# Patient Record
Sex: MALE | Race: WHITE
[De-identification: names, ages, dates, MRNs, and addresses within clinical notes are randomized per-mention and may not be internally consistent; named-entity substitution may affect disease eponyms.]

---

## 2019-09-30 NOTE — PRE OP HISTORY & PHYSICAL
DATE OF SURGERY:  October 1, 2019.

 

CHIEF COMPLAINT:  Lesion in the left cheek and lesion in the left floor of the mouth.

 

HISTORY OF PRESENT ILLNESS:  This 39-year-old male was noted to have a lesion in the

left cheek around the area of the puncta of the left parotid gland, which is more

swollen.  The patient denies any dysphagia, odynophagia, or shortness of breath.  He has

no trouble swallowing.  The patient did have radiation to the nose for tumor, that was

treated about 3 years ago.  The record for the treatment has not arrived.  The patient

on examination in the office was also noted to have a lesion in the left floor of the

mouth.  He smokes about 10 cigars a day and a social drinker. 

 

REVIEW OF SYSTEMS:

System review showed no recent cardiovascular, respiratory, or GI problem.

 

PAST MEDICAL HISTORY:  The patient has no significant medical problem in the past.  He

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 

 

PAST SURGICAL HISTORY:  The patient has previous bilateral shoulder surgery, right knee

surgery, and removal of testicular cancer. 

 

ALLERGIES:  THE PATIENT IS ALLERGIC TO ASPIRIN, WHICH GIVES HIM SWELLING.

 

MEDICATIONS:  He is on testosterone.

 

SOCIAL HISTORY:  He smokes about 10 cigars a day and is a social drinker.

 

FAMILY HISTORY:  Noncontributory.

 

PHYSICAL EXAMINATION:

VITAL SIGNS:  On examination, the patient's vital signs were within normal limits. 

HEENT:  Ear exam show normal tympanic membranes bilaterally.  Nasal exam showed no

obvious abnormality.  Oropharynx and oral cavity show little leukoplakia in the left

floor of the mouth and left puncta of the parotid gland was more swollen.  No

abnormality was palpable. 

NECK:  Showed no lymph node or thyroid palpable. 

CHEST:  Showed good air entry bilaterally. 

CARDIOVASCULAR:  Showed S1, S2.  No murmur noted.

ASSESSMENT AND PLAN:  Mr. Ramon has questionable lesion in the left floor of the mouth

and the swelling around the left parotid puncta.  He had a CT scan of the neck with

contrast, which did not show any obvious abnormality.  Because of the patient's previous

history, suggested treatment is panendoscopy, biopsy, and other necessary procedure.

Complication of procedure includes, but not limited to bleeding, infection, perforation

of the esophagus, pneumomediastinum, mediastinitis, airway compromise, persistent

recurrence of the problem. 

The alternate will be continue observation, continue antibiotic therapy, biopsy of the

lesion in the office setting.  The patient has elected to undergo surgical procedure. 

 

 

 

______________________________

MD NIKOLAI Murcia/RUBÉN

D:  09/29/2019 12:05:14

T:  09/29/2019 13:18:08

Job #:  [id number]

## 2019-10-01 ENCOUNTER — HOSPITAL ENCOUNTER (OUTPATIENT)
Dept: HOSPITAL 88 - OR | Age: 39
Discharge: HOME | End: 2019-10-01
Attending: OTOLARYNGOLOGY
Payer: COMMERCIAL

## 2019-10-01 VITALS — DIASTOLIC BLOOD PRESSURE: 83 MMHG | SYSTOLIC BLOOD PRESSURE: 134 MMHG

## 2019-10-01 DIAGNOSIS — Z85.828: ICD-10-CM

## 2019-10-01 DIAGNOSIS — Z85.47: ICD-10-CM

## 2019-10-01 DIAGNOSIS — F17.290: ICD-10-CM

## 2019-10-01 DIAGNOSIS — K13.21: Primary | ICD-10-CM

## 2019-10-01 PROCEDURE — 31622 DX BRONCHOSCOPE/WASH: CPT

## 2019-10-01 PROCEDURE — 11106 INCAL BX SKN SINGLE LES: CPT

## 2019-10-01 PROCEDURE — 41108 BIOPSY OF FLOOR OF MOUTH: CPT

## 2019-10-01 PROCEDURE — 31525 DX LARYNGOSCOPY EXCL NB: CPT

## 2019-10-01 PROCEDURE — 88305 TISSUE EXAM BY PATHOLOGIST: CPT

## 2019-10-01 PROCEDURE — 43191 ESOPHAGOSCOPY RIGID TRNSO DX: CPT

## 2019-10-01 NOTE — OPERATIVE REPORT
DATE OF PROCEDURE:  10/01/2019

 

SURGEON:  Ervin Lucia MD

 

CHIEF COMPLAINT:  Lesion on the left cheek and lesion in the floor of the mouth.

 

POSTOPERATIVE DIAGNOSIS:  Lesion on the left cheek and lesion in the floor of the mouth.

 

OPERATIVE PROCEDURES:  Direct laryngoscopy, rigid esophagoscopy, rigid bronchoscopy, and

biopsy of the left cheek and left floor of the mouth. 

 

ANESTHESIA:  [facility].

 

INDICATIONS:  This 39-year-old male was noted to have increased swelling around the

opening of the parotid duct on the left side.  The patient had previous radiation to the

nose for tumor.  On examination, he was noted to have a hypertrophy of the puncta of the

left parotid duct and some leukoplakia in the floor of the mouth on the left side.  It

was decided that panendoscopy, microlaryngoscopy, and other necessary procedure will be

beneficial for him.  The patient had a CAT scan of the neck with contrast prior to

surgery, which did not show any abnormality. 

 

DESCRIPTION OF PROCEDURE:  The patient was taken to the operating room, put under

general anesthesia, and endotracheally intubated.  The patient was positioned and rigid

esophagoscopy was performed.  The esophagoscope was passed through the cricopharyngeus

muscle.  The esophagus was examined to about 25 cm from the incisor.  No abnormality was

noted.  The esophagoscope was retrieved. 

 

The rigid bronchoscopy was performed.  A size #4 bronchoscope with Zabala wire was

used.  The bronchoscope was passed parallel to the endotracheal tube.  The endotracheal

tube cuff was deflated.  Trachea was examined down to the ashok.  No abnormality was

noted.  The bronchoscope was retrieved.  Endotracheal tube cuff was reinflated. 

 

The direct laryngoscopy was performed.  Using a Dedo laryngoscope, the oropharynx and

oral cavity were examined.  The puncta were noted to be more hypertrophy on the left

side and increased irregularity was noted in the floor of the mouth on the left.

Piriform sinus on either side was examined.  No abnormality was noted.  The larynx was

examined.  Both the true and false vocal folds were examined.  No abnormality was noted. 

 

The McIvor mouth gag was inserted into the mouth.  The left cheek was examined.  This

was biopsied using cup forceps.  Care was taken during the biopsy not to create any

stenosis along the puncta, the parotid duct on the left side.  Biopsy was done anterior

to the parotid duct.  The biopsy was done anterior to the parotid puncta.  The floor of

the mouth was examined.  The small area of leukoplakia was noted.  This was biopsied

using a cup forceps.  Again, this tissue was sent for permanent section.  The patient

tolerated the above procedure well with minimal 

blood loss.  The patient was able to be transferred to recovery room in stable

condition.  He was given 20 mg of Decadron intraoperatively. 

 

 

 

 

______________________________

MD NIKOLAI Murcia/RUBÉN

D:  10/01/2019 10:23:43

T:  10/01/2019 11:51:10

Job #:  [id number]